# Patient Record
Sex: FEMALE | Race: WHITE | NOT HISPANIC OR LATINO
[De-identification: names, ages, dates, MRNs, and addresses within clinical notes are randomized per-mention and may not be internally consistent; named-entity substitution may affect disease eponyms.]

---

## 2017-03-21 ENCOUNTER — NON-APPOINTMENT (OUTPATIENT)
Age: 42
End: 2017-03-21

## 2017-05-12 ENCOUNTER — LABORATORY RESULT (OUTPATIENT)
Age: 42
End: 2017-05-12

## 2017-05-15 LAB
25(OH)D3 SERPL-MCNC: 26.8 NG/ML
CHOLEST SERPL-MCNC: 172 MG/DL
CHOLEST/HDLC SERPL: 4 RATIO
HDLC SERPL-MCNC: 42 MG/DL
LDLC SERPL CALC-MCNC: 107 MG/DL
T3 SERPL-MCNC: 105 NG/DL
T3FREE SERPL-MCNC: 2.81 PG/ML
T3RU NFR SERPL: 0.98 INDEX
T4 FREE SERPL-MCNC: 1.5 NG/DL
T4 SERPL-MCNC: 8.8 UG/DL
TRIGL SERPL-MCNC: 115 MG/DL
TSH SERPL-ACNC: 1.97 UIU/ML

## 2017-09-28 ENCOUNTER — OTHER (OUTPATIENT)
Age: 42
End: 2017-09-28

## 2017-09-28 ENCOUNTER — LABORATORY RESULT (OUTPATIENT)
Age: 42
End: 2017-09-28

## 2017-09-29 LAB
ALBUMIN SERPL ELPH-MCNC: 4 G/DL
ALP BLD-CCNC: 60 U/L
ALT SERPL-CCNC: 15 U/L
ANION GAP SERPL CALC-SCNC: 15 MMOL/L
AST SERPL-CCNC: 17 U/L
BASOPHILS # BLD AUTO: 0.04 K/UL
BASOPHILS NFR BLD AUTO: 0.7 %
BILIRUB SERPL-MCNC: 0.2 MG/DL
BUN SERPL-MCNC: 10 MG/DL
CALCIUM SERPL-MCNC: 8.9 MG/DL
CHLORIDE SERPL-SCNC: 105 MMOL/L
CHOLEST SERPL-MCNC: 176 MG/DL
CHOLEST/HDLC SERPL: 4 RATIO
CO2 SERPL-SCNC: 22 MMOL/L
CREAT SERPL-MCNC: 0.76 MG/DL
EOSINOPHIL # BLD AUTO: 0.15 K/UL
EOSINOPHIL NFR BLD AUTO: 2.7 %
GLUCOSE SERPL-MCNC: 85 MG/DL
HBA1C MFR BLD HPLC: 5.5 %
HCT VFR BLD CALC: 40.1 %
HDLC SERPL-MCNC: 44 MG/DL
HGB BLD-MCNC: 13.3 G/DL
IMM GRANULOCYTES NFR BLD AUTO: 1.6 %
LDLC SERPL CALC-MCNC: 113 MG/DL
LYMPHOCYTES # BLD AUTO: 2 K/UL
LYMPHOCYTES NFR BLD AUTO: 35.8 %
MAN DIFF?: NORMAL
MCHC RBC-ENTMCNC: 28.8 PG
MCHC RBC-ENTMCNC: 33.2 GM/DL
MCV RBC AUTO: 86.8 FL
MONOCYTES # BLD AUTO: 0.33 K/UL
MONOCYTES NFR BLD AUTO: 5.9 %
NEUTROPHILS # BLD AUTO: 2.98 K/UL
NEUTROPHILS NFR BLD AUTO: 53.3 %
PLATELET # BLD AUTO: 245 K/UL
POTASSIUM SERPL-SCNC: 4.5 MMOL/L
PROT SERPL-MCNC: 6.8 G/DL
RBC # BLD: 4.62 M/UL
RBC # FLD: 13.8 %
SODIUM SERPL-SCNC: 142 MMOL/L
TRIGL SERPL-MCNC: 93 MG/DL
TSH SERPL-ACNC: 1.62 UIU/ML
WBC # FLD AUTO: 5.59 K/UL

## 2018-01-19 ENCOUNTER — OTHER (OUTPATIENT)
Age: 43
End: 2018-01-19

## 2018-01-19 ENCOUNTER — LABORATORY RESULT (OUTPATIENT)
Age: 43
End: 2018-01-19

## 2018-01-23 LAB
T3 SERPL-MCNC: 94 NG/DL
T3FREE SERPL-MCNC: 2.36 PG/ML
T3RU NFR SERPL: 1.14 INDEX
T4 FREE SERPL-MCNC: 1.1 NG/DL
T4 SERPL-MCNC: 7.1 UG/DL
TSH SERPL-ACNC: 2.49 UIU/ML

## 2018-05-30 ENCOUNTER — LABORATORY RESULT (OUTPATIENT)
Age: 43
End: 2018-05-30

## 2018-05-30 ENCOUNTER — OTHER (OUTPATIENT)
Age: 43
End: 2018-05-30

## 2018-05-31 LAB
T3 SERPL-MCNC: 108 NG/DL
T3FREE SERPL-MCNC: 2.77 PG/ML
T3RU NFR SERPL: 1.05 INDEX
T4 FREE SERPL-MCNC: 1.4 NG/DL
T4 SERPL-MCNC: 8.1 UG/DL
TSH SERPL-ACNC: 1.71 UIU/ML

## 2018-08-01 ENCOUNTER — RX RENEWAL (OUTPATIENT)
Age: 43
End: 2018-08-01

## 2018-10-03 LAB
25(OH)D3 SERPL-MCNC: 28.4 NG/ML
ALBUMIN SERPL ELPH-MCNC: 4.4 G/DL
ALP BLD-CCNC: 67 U/L
ALT SERPL-CCNC: 20 U/L
ANION GAP SERPL CALC-SCNC: 13 MMOL/L
AST SERPL-CCNC: 19 U/L
BASOPHILS # BLD AUTO: 0.04 K/UL
BASOPHILS NFR BLD AUTO: 0.6 %
BILIRUB SERPL-MCNC: 0.3 MG/DL
BUN SERPL-MCNC: 10 MG/DL
CALCIUM SERPL-MCNC: 8.7 MG/DL
CHLORIDE SERPL-SCNC: 102 MMOL/L
CHOLEST SERPL-MCNC: 182 MG/DL
CHOLEST/HDLC SERPL: 4.4 RATIO
CO2 SERPL-SCNC: 22 MMOL/L
CREAT SERPL-MCNC: 0.65 MG/DL
EOSINOPHIL # BLD AUTO: 0.22 K/UL
EOSINOPHIL NFR BLD AUTO: 3.5 %
GLUCOSE SERPL-MCNC: 91 MG/DL
HBA1C MFR BLD HPLC: 5.5 %
HCT VFR BLD CALC: 41.4 %
HDLC SERPL-MCNC: 41 MG/DL
HGB BLD-MCNC: 13.7 G/DL
IMM GRANULOCYTES NFR BLD AUTO: 0.3 %
LDLC SERPL CALC-MCNC: 117 MG/DL
LYMPHOCYTES # BLD AUTO: 2.02 K/UL
LYMPHOCYTES NFR BLD AUTO: 32.4 %
MAN DIFF?: NORMAL
MCHC RBC-ENTMCNC: 29 PG
MCHC RBC-ENTMCNC: 33.1 GM/DL
MCV RBC AUTO: 87.5 FL
MONOCYTES # BLD AUTO: 0.31 K/UL
MONOCYTES NFR BLD AUTO: 5 %
NEUTROPHILS # BLD AUTO: 3.62 K/UL
NEUTROPHILS NFR BLD AUTO: 58.2 %
PLATELET # BLD AUTO: 248 K/UL
POTASSIUM SERPL-SCNC: 4.3 MMOL/L
PROT SERPL-MCNC: 7.2 G/DL
RBC # BLD: 4.73 M/UL
RBC # FLD: 14.9 %
SODIUM SERPL-SCNC: 137 MMOL/L
TRIGL SERPL-MCNC: 119 MG/DL
TSH SERPL-ACNC: 1.58 UIU/ML
WBC # FLD AUTO: 6.23 K/UL

## 2019-01-02 ENCOUNTER — LABORATORY RESULT (OUTPATIENT)
Age: 44
End: 2019-01-02

## 2019-01-02 ENCOUNTER — OTHER (OUTPATIENT)
Age: 44
End: 2019-01-02

## 2019-01-03 LAB
T3 SERPL-MCNC: 107 NG/DL
T3FREE SERPL-MCNC: 2.95 PG/ML
T3RU NFR SERPL: 1.06 INDEX
T4 FREE SERPL-MCNC: 1.5 NG/DL
T4 SERPL-MCNC: 8.8 UG/DL
TSH SERPL-ACNC: 1.7 UIU/ML

## 2019-01-24 ENCOUNTER — NON-APPOINTMENT (OUTPATIENT)
Age: 44
End: 2019-01-24

## 2019-01-29 ENCOUNTER — APPOINTMENT (OUTPATIENT)
Dept: HEART AND VASCULAR | Facility: CLINIC | Age: 44
End: 2019-01-29
Payer: COMMERCIAL

## 2019-01-29 VITALS
DIASTOLIC BLOOD PRESSURE: 74 MMHG | HEIGHT: 63 IN | WEIGHT: 216 LBS | BODY MASS INDEX: 38.27 KG/M2 | HEART RATE: 82 BPM | SYSTOLIC BLOOD PRESSURE: 108 MMHG

## 2019-01-29 PROCEDURE — 93320 DOPPLER ECHO COMPLETE: CPT

## 2019-01-29 PROCEDURE — 93351 STRESS TTE COMPLETE: CPT

## 2019-01-29 PROCEDURE — 93325 DOPPLER ECHO COLOR FLOW MAPG: CPT

## 2019-06-26 ENCOUNTER — NON-APPOINTMENT (OUTPATIENT)
Age: 44
End: 2019-06-26

## 2019-06-26 ENCOUNTER — TRANSCRIPTION ENCOUNTER (OUTPATIENT)
Age: 44
End: 2019-06-26

## 2019-06-26 ENCOUNTER — LABORATORY RESULT (OUTPATIENT)
Age: 44
End: 2019-06-26

## 2019-06-26 ENCOUNTER — APPOINTMENT (OUTPATIENT)
Dept: HEART AND VASCULAR | Facility: CLINIC | Age: 44
End: 2019-06-26
Payer: COMMERCIAL

## 2019-06-26 DIAGNOSIS — R07.9 CHEST PAIN, UNSPECIFIED: ICD-10-CM

## 2019-06-26 DIAGNOSIS — R07.89 OTHER CHEST PAIN: ICD-10-CM

## 2019-06-26 PROCEDURE — 93306 TTE W/DOPPLER COMPLETE: CPT

## 2019-06-27 ENCOUNTER — RESULT REVIEW (OUTPATIENT)
Age: 44
End: 2019-06-27

## 2019-06-27 LAB
ALBUMIN SERPL ELPH-MCNC: 4.3 G/DL
ALP BLD-CCNC: 59 U/L
ALT SERPL-CCNC: 13 U/L
ANION GAP SERPL CALC-SCNC: 12 MMOL/L
AST SERPL-CCNC: 16 U/L
BASOPHILS # BLD AUTO: 0.05 K/UL
BASOPHILS NFR BLD AUTO: 0.7 %
BILIRUB SERPL-MCNC: 0.3 MG/DL
BUN SERPL-MCNC: 8 MG/DL
CALCIUM SERPL-MCNC: 8.9 MG/DL
CHLORIDE SERPL-SCNC: 105 MMOL/L
CHOLEST SERPL-MCNC: 180 MG/DL
CHOLEST/HDLC SERPL: 5.1 RATIO
CK SERPL-CCNC: 58 U/L
CO2 SERPL-SCNC: 23 MMOL/L
CREAT SERPL-MCNC: 0.8 MG/DL
EOSINOPHIL # BLD AUTO: 0.16 K/UL
EOSINOPHIL NFR BLD AUTO: 2.1 %
GLUCOSE SERPL-MCNC: 86 MG/DL
HCT VFR BLD CALC: 43.1 %
HDLC SERPL-MCNC: 35 MG/DL
HGB BLD-MCNC: 13.2 G/DL
IMM GRANULOCYTES NFR BLD AUTO: 0.4 %
LDLC SERPL CALC-MCNC: 122 MG/DL
LYMPHOCYTES # BLD AUTO: 2.6 K/UL
LYMPHOCYTES NFR BLD AUTO: 34.3 %
MAN DIFF?: NORMAL
MCHC RBC-ENTMCNC: 28.4 PG
MCHC RBC-ENTMCNC: 30.6 GM/DL
MCV RBC AUTO: 92.9 FL
MONOCYTES # BLD AUTO: 0.37 K/UL
MONOCYTES NFR BLD AUTO: 4.9 %
NEUTROPHILS # BLD AUTO: 4.36 K/UL
NEUTROPHILS NFR BLD AUTO: 57.6 %
PLATELET # BLD AUTO: 260 K/UL
POTASSIUM SERPL-SCNC: 4.2 MMOL/L
PROT SERPL-MCNC: 7.2 G/DL
RBC # BLD: 4.64 M/UL
RBC # FLD: 14 %
SODIUM SERPL-SCNC: 140 MMOL/L
TRIGL SERPL-MCNC: 117 MG/DL
TROPONIN I SERPL-MCNC: 0.02 NG/ML
TSH SERPL-ACNC: 1.36 UIU/ML
WBC # FLD AUTO: 7.57 K/UL

## 2019-09-04 ENCOUNTER — LABORATORY RESULT (OUTPATIENT)
Age: 44
End: 2019-09-04

## 2019-09-05 ENCOUNTER — APPOINTMENT (OUTPATIENT)
Dept: HEART AND VASCULAR | Facility: CLINIC | Age: 44
End: 2019-09-05
Payer: COMMERCIAL

## 2019-09-05 PROCEDURE — 36415 COLL VENOUS BLD VENIPUNCTURE: CPT

## 2019-09-06 LAB
T3FREE SERPL-MCNC: 2.9 PG/ML
T3RU NFR SERPL: 1 TBI
T4 SERPL-MCNC: 7.9 UG/DL
TSH SERPL-ACNC: 2.66 UIU/ML

## 2019-12-13 ENCOUNTER — LABORATORY RESULT (OUTPATIENT)
Age: 44
End: 2019-12-13

## 2019-12-15 LAB
T3FREE SERPL-MCNC: 2.86 PG/ML
T3RU NFR SERPL: 1 TBI
T4 SERPL-MCNC: 8.5 UG/DL
TSH SERPL-ACNC: 0.79 UIU/ML

## 2020-03-13 ENCOUNTER — LABORATORY RESULT (OUTPATIENT)
Age: 45
End: 2020-03-13

## 2020-03-14 LAB
CHOLEST SERPL-MCNC: 189 MG/DL
CHOLEST/HDLC SERPL: 4 RATIO
HDLC SERPL-MCNC: 47 MG/DL
LDLC SERPL CALC-MCNC: 118 MG/DL
T3FREE SERPL-MCNC: 2.91 PG/ML
T3RU NFR SERPL: 1 TBI
T4 SERPL-MCNC: 8.8 UG/DL
TRIGL SERPL-MCNC: 116 MG/DL
TSH SERPL-ACNC: 1.3 UIU/ML

## 2020-04-20 ENCOUNTER — APPOINTMENT (OUTPATIENT)
Dept: HEART AND VASCULAR | Facility: CLINIC | Age: 45
End: 2020-04-20

## 2020-04-25 ENCOUNTER — MESSAGE (OUTPATIENT)
Age: 45
End: 2020-04-25

## 2020-05-01 ENCOUNTER — APPOINTMENT (OUTPATIENT)
Age: 45
End: 2020-05-01

## 2020-05-04 LAB
SARS-COV-2 IGG SERPL IA-ACNC: 4.6 RATIO
SARS-COV-2 IGG SERPL QL IA: POSITIVE

## 2020-09-17 LAB
ALBUMIN SERPL ELPH-MCNC: 4.3 G/DL
ALP BLD-CCNC: 60 U/L
ALT SERPL-CCNC: 11 U/L
ANION GAP SERPL CALC-SCNC: 12 MMOL/L
AST SERPL-CCNC: 12 U/L
BASOPHILS # BLD AUTO: 0.04 K/UL
BASOPHILS NFR BLD AUTO: 0.8 %
BILIRUB SERPL-MCNC: 0.3 MG/DL
BUN SERPL-MCNC: 9 MG/DL
CALCIUM SERPL-MCNC: 8.7 MG/DL
CHLORIDE SERPL-SCNC: 106 MMOL/L
CHOLEST SERPL-MCNC: 202 MG/DL
CHOLEST/HDLC SERPL: 6.2 RATIO
CO2 SERPL-SCNC: 24 MMOL/L
CREAT SERPL-MCNC: 0.75 MG/DL
EOSINOPHIL # BLD AUTO: 0.21 K/UL
EOSINOPHIL NFR BLD AUTO: 4 %
GLUCOSE SERPL-MCNC: 93 MG/DL
HCT VFR BLD CALC: 41.3 %
HDLC SERPL-MCNC: 32 MG/DL
HGB BLD-MCNC: 12.6 G/DL
IMM GRANULOCYTES NFR BLD AUTO: 0.4 %
LDLC SERPL CALC-MCNC: 145 MG/DL
LYMPHOCYTES # BLD AUTO: 1.99 K/UL
LYMPHOCYTES NFR BLD AUTO: 37.9 %
MAN DIFF?: NORMAL
MCHC RBC-ENTMCNC: 28 PG
MCHC RBC-ENTMCNC: 30.5 GM/DL
MCV RBC AUTO: 91.8 FL
MONOCYTES # BLD AUTO: 0.33 K/UL
MONOCYTES NFR BLD AUTO: 6.3 %
NEUTROPHILS # BLD AUTO: 2.66 K/UL
NEUTROPHILS NFR BLD AUTO: 50.6 %
PLATELET # BLD AUTO: 236 K/UL
POTASSIUM SERPL-SCNC: 4.3 MMOL/L
PROT SERPL-MCNC: 6.6 G/DL
RBC # BLD: 4.5 M/UL
RBC # FLD: 14 %
SODIUM SERPL-SCNC: 142 MMOL/L
T3 SERPL-MCNC: 86 NG/DL
T3FREE SERPL-MCNC: 2.23 PG/ML
T4 FREE SERPL-MCNC: 1.2 NG/DL
T4 SERPL-MCNC: 7.7 UG/DL
TRIGL SERPL-MCNC: 123 MG/DL
TSH SERPL-ACNC: 2.27 UIU/ML
WBC # FLD AUTO: 5.25 K/UL

## 2021-03-11 ENCOUNTER — NON-APPOINTMENT (OUTPATIENT)
Age: 46
End: 2021-03-11

## 2021-03-12 ENCOUNTER — NON-APPOINTMENT (OUTPATIENT)
Age: 46
End: 2021-03-12

## 2021-03-12 ENCOUNTER — APPOINTMENT (OUTPATIENT)
Dept: HEART AND VASCULAR | Facility: CLINIC | Age: 46
End: 2021-03-12

## 2021-03-19 LAB
ALBUMIN SERPL ELPH-MCNC: 4.3 G/DL
ALP BLD-CCNC: 60 U/L
ALT SERPL-CCNC: 13 U/L
ANION GAP SERPL CALC-SCNC: 13 MMOL/L
AST SERPL-CCNC: 15 U/L
BASOPHILS # BLD AUTO: 0.04 K/UL
BASOPHILS NFR BLD AUTO: 0.6 %
BILIRUB SERPL-MCNC: 0.2 MG/DL
BUN SERPL-MCNC: 7 MG/DL
CALCIUM SERPL-MCNC: 8.9 MG/DL
CHLORIDE SERPL-SCNC: 104 MMOL/L
CHOLEST SERPL-MCNC: 194 MG/DL
CO2 SERPL-SCNC: 21 MMOL/L
COVID-19 NUCLEOCAPSID  GAM ANTIBODY INTERPRETATION: POSITIVE
COVID-19 SPIKE DOMAIN ANTIBODY INTERPRETATION: POSITIVE
CREAT SERPL-MCNC: 0.7 MG/DL
EOSINOPHIL # BLD AUTO: 0.19 K/UL
EOSINOPHIL NFR BLD AUTO: 2.8 %
GLUCOSE SERPL-MCNC: 98 MG/DL
HCT VFR BLD CALC: 42.5 %
HDLC SERPL-MCNC: 50 MG/DL
HGB BLD-MCNC: 13.1 G/DL
IMM GRANULOCYTES NFR BLD AUTO: 0.4 %
LDLC SERPL CALC-MCNC: 127 MG/DL
LYMPHOCYTES # BLD AUTO: 2.2 K/UL
LYMPHOCYTES NFR BLD AUTO: 32.5 %
MAN DIFF?: NORMAL
MCHC RBC-ENTMCNC: 27.6 PG
MCHC RBC-ENTMCNC: 30.8 GM/DL
MCV RBC AUTO: 89.7 FL
MONOCYTES # BLD AUTO: 0.41 K/UL
MONOCYTES NFR BLD AUTO: 6.1 %
NEUTROPHILS # BLD AUTO: 3.89 K/UL
NEUTROPHILS NFR BLD AUTO: 57.6 %
NONHDLC SERPL-MCNC: 144 MG/DL
PLATELET # BLD AUTO: 256 K/UL
POTASSIUM SERPL-SCNC: 3.9 MMOL/L
PROT SERPL-MCNC: 7.4 G/DL
RBC # BLD: 4.74 M/UL
RBC # FLD: 14 %
SARS-COV-2 AB SERPL IA-ACNC: >250 U/ML
SARS-COV-2 AB SERPL QL IA: 27.3 INDEX
SODIUM SERPL-SCNC: 137 MMOL/L
T3 SERPL-MCNC: 96 NG/DL
T3FREE SERPL-MCNC: 2.4 PG/ML
T4 FREE SERPL-MCNC: 1.4 NG/DL
T4 SERPL-MCNC: 8.3 UG/DL
TRIGL SERPL-MCNC: 83 MG/DL
TSH SERPL-ACNC: 1.15 UIU/ML
VIT B12 SERPL-MCNC: 249 PG/ML
WBC # FLD AUTO: 6.76 K/UL

## 2021-03-21 ENCOUNTER — NON-APPOINTMENT (OUTPATIENT)
Age: 46
End: 2021-03-21

## 2021-03-22 LAB — 24R-OH-CALCIDIOL SERPL-MCNC: 66.9 PG/ML

## 2021-06-17 DIAGNOSIS — M17.0 BILATERAL PRIMARY OSTEOARTHRITIS OF KNEE: ICD-10-CM

## 2021-06-17 DIAGNOSIS — Z00.00 ENCOUNTER FOR GENERAL ADULT MEDICAL EXAMINATION W/OUT ABNORMAL FINDINGS: ICD-10-CM

## 2021-06-18 ENCOUNTER — NON-APPOINTMENT (OUTPATIENT)
Age: 46
End: 2021-06-18

## 2021-06-18 LAB
BASOPHILS # BLD AUTO: 0.06 K/UL
BASOPHILS NFR BLD AUTO: 0.9 %
CRP SERPL HS-MCNC: 3.57 MG/L
EOSINOPHIL # BLD AUTO: 0.15 K/UL
EOSINOPHIL NFR BLD AUTO: 2.2 %
ERYTHROCYTE [SEDIMENTATION RATE] IN BLOOD BY WESTERGREN METHOD: 13 MM/HR
HCT VFR BLD CALC: 41.7 %
HGB BLD-MCNC: 12.9 G/DL
IMM GRANULOCYTES NFR BLD AUTO: 0.1 %
LYMPHOCYTES # BLD AUTO: 2.05 K/UL
LYMPHOCYTES NFR BLD AUTO: 29.5 %
MAN DIFF?: NORMAL
MCHC RBC-ENTMCNC: 27.7 PG
MCHC RBC-ENTMCNC: 30.9 GM/DL
MCV RBC AUTO: 89.5 FL
MONOCYTES # BLD AUTO: 0.42 K/UL
MONOCYTES NFR BLD AUTO: 6.1 %
NEUTROPHILS # BLD AUTO: 4.25 K/UL
NEUTROPHILS NFR BLD AUTO: 61.2 %
PLATELET # BLD AUTO: 267 K/UL
RBC # BLD: 4.66 M/UL
RBC # FLD: 13.9 %
RHEUMATOID FACT SER QL: <10 IU/ML
WBC # FLD AUTO: 6.94 K/UL

## 2021-06-20 ENCOUNTER — NON-APPOINTMENT (OUTPATIENT)
Age: 46
End: 2021-06-20

## 2021-06-21 LAB
ANA PAT FLD IF-IMP: ABNORMAL
ANA SER IF-ACNC: ABNORMAL

## 2021-06-22 LAB

## 2021-09-14 ENCOUNTER — TRANSCRIPTION ENCOUNTER (OUTPATIENT)
Age: 46
End: 2021-09-14

## 2021-09-25 LAB
ALBUMIN SERPL ELPH-MCNC: 4.5 G/DL
ALP BLD-CCNC: 69 U/L
ALT SERPL-CCNC: 13 U/L
ANION GAP SERPL CALC-SCNC: 16 MMOL/L
AST SERPL-CCNC: 13 U/L
BASOPHILS # BLD AUTO: 0.06 K/UL
BASOPHILS NFR BLD AUTO: 0.9 %
BILIRUB SERPL-MCNC: 0.4 MG/DL
BUN SERPL-MCNC: 9 MG/DL
CALCIUM SERPL-MCNC: 9.3 MG/DL
CHLORIDE SERPL-SCNC: 105 MMOL/L
CHOLEST SERPL-MCNC: 183 MG/DL
CO2 SERPL-SCNC: 21 MMOL/L
CREAT SERPL-MCNC: 0.86 MG/DL
EOSINOPHIL # BLD AUTO: 0.14 K/UL
EOSINOPHIL NFR BLD AUTO: 2 %
GLUCOSE SERPL-MCNC: 107 MG/DL
HCT VFR BLD CALC: 41.7 %
HDLC SERPL-MCNC: 39 MG/DL
HGB BLD-MCNC: 13 G/DL
IMM GRANULOCYTES NFR BLD AUTO: 0.4 %
LDLC SERPL CALC-MCNC: 120 MG/DL
LYMPHOCYTES # BLD AUTO: 1.89 K/UL
LYMPHOCYTES NFR BLD AUTO: 26.9 %
MAN DIFF?: NORMAL
MCHC RBC-ENTMCNC: 27.7 PG
MCHC RBC-ENTMCNC: 31.2 GM/DL
MCV RBC AUTO: 88.9 FL
MONOCYTES # BLD AUTO: 0.43 K/UL
MONOCYTES NFR BLD AUTO: 6.1 %
NEUTROPHILS # BLD AUTO: 4.48 K/UL
NEUTROPHILS NFR BLD AUTO: 63.7 %
NONHDLC SERPL-MCNC: 144 MG/DL
PLATELET # BLD AUTO: 261 K/UL
POTASSIUM SERPL-SCNC: 4.9 MMOL/L
PROT SERPL-MCNC: 6.9 G/DL
RBC # BLD: 4.69 M/UL
RBC # FLD: 14.2 %
SODIUM SERPL-SCNC: 142 MMOL/L
T3 SERPL-MCNC: 100 NG/DL
T3FREE SERPL-MCNC: 2.82 PG/ML
T4 FREE SERPL-MCNC: 1.4 NG/DL
T4 SERPL-MCNC: 8.3 UG/DL
TRIGL SERPL-MCNC: 120 MG/DL
TSH SERPL-ACNC: 1.02 UIU/ML
WBC # FLD AUTO: 7.03 K/UL

## 2021-09-27 ENCOUNTER — NON-APPOINTMENT (OUTPATIENT)
Age: 46
End: 2021-09-27

## 2022-04-22 RX ORDER — CEPHALEXIN 500 MG/1
500 TABLET ORAL 3 TIMES DAILY
Qty: 21 | Refills: 0 | Status: DISCONTINUED | COMMUNITY
Start: 2021-05-17 | End: 2022-04-22

## 2022-04-22 RX ORDER — METHYLPREDNISOLONE 4 MG/1
4 TABLET ORAL
Qty: 1 | Refills: 0 | Status: DISCONTINUED | COMMUNITY
Start: 2018-08-01 | End: 2022-04-22

## 2022-05-04 ENCOUNTER — NON-APPOINTMENT (OUTPATIENT)
Age: 47
End: 2022-05-04

## 2022-07-01 ENCOUNTER — NON-APPOINTMENT (OUTPATIENT)
Age: 47
End: 2022-07-01

## 2022-07-01 LAB
CHOLEST SERPL-MCNC: 177 MG/DL
ESTIMATED AVERAGE GLUCOSE: 114 MG/DL
HBA1C MFR BLD HPLC: 5.6 %
HDLC SERPL-MCNC: 41 MG/DL
LDLC SERPL CALC-MCNC: 110 MG/DL
NONHDLC SERPL-MCNC: 136 MG/DL
T3 SERPL-MCNC: 114 NG/DL
T3FREE SERPL-MCNC: 2.88 PG/ML
T4 SERPL-MCNC: 9.2 UG/DL
TRIGL SERPL-MCNC: 131 MG/DL
TSH SERPL-ACNC: 1.06 UIU/ML

## 2022-07-22 ENCOUNTER — APPOINTMENT (OUTPATIENT)
Dept: HEART AND VASCULAR | Facility: CLINIC | Age: 47
End: 2022-07-22

## 2022-09-22 ENCOUNTER — NON-APPOINTMENT (OUTPATIENT)
Age: 47
End: 2022-09-22

## 2022-09-22 ENCOUNTER — APPOINTMENT (OUTPATIENT)
Dept: ENDOCRINOLOGY | Facility: CLINIC | Age: 47
End: 2022-09-22

## 2022-09-22 VITALS
SYSTOLIC BLOOD PRESSURE: 140 MMHG | WEIGHT: 224 LBS | HEIGHT: 63 IN | BODY MASS INDEX: 39.69 KG/M2 | OXYGEN SATURATION: 99 % | DIASTOLIC BLOOD PRESSURE: 82 MMHG | HEART RATE: 91 BPM

## 2022-09-22 DIAGNOSIS — E04.0 NONTOXIC DIFFUSE GOITER: ICD-10-CM

## 2022-09-22 PROCEDURE — 99204 OFFICE O/P NEW MOD 45 MIN: CPT

## 2022-09-22 RX ORDER — PSYLLIUM HUSK 0.4 G
CAPSULE ORAL
Refills: 0 | Status: ACTIVE | COMMUNITY

## 2022-09-22 RX ORDER — UBIDECARENONE/VIT E ACET 100MG-5
1000 CAPSULE ORAL
Refills: 0 | Status: ACTIVE | COMMUNITY

## 2022-09-22 RX ORDER — AZITHROMYCIN 250 MG/1
250 TABLET, FILM COATED ORAL
Qty: 1 | Refills: 1 | Status: DISCONTINUED | COMMUNITY
Start: 2020-04-07 | End: 2022-09-22

## 2022-09-22 NOTE — HISTORY OF PRESENT ILLNESS
[FreeTextEntry1] : 47-year-old lady presents establish care for hypothyroidism managed diagnosed with Hashimoto's disease or hypothyroidism at 28 years of age denies any local neck symptoms except for difficulty swallowing solids.  She denies any head radiation exposure to head and neck no exposure to nuclear accidents no family history of thyroid cancer no thyroid surgery has been on Synthroid 88 mcg for a long time.  Not taking biotin lithium or amiodarone no recent thyroid ultrasound had some thyroid blood work as noted in the chart doses and fairly stable.  Has been noticing shortness of breath more recently after her second COVID infection has had bad headaches and loss of smell and will be seeing ENT soon.

## 2022-09-23 LAB
ESTIMATED AVERAGE GLUCOSE: 111 MG/DL
HBA1C MFR BLD HPLC: 5.5 %
T3 SERPL-MCNC: 103 NG/DL
T4 FREE SERPL-MCNC: 1.2 NG/DL
TSH SERPL-ACNC: 2.58 UIU/ML

## 2022-09-26 LAB
THYROGLOB AB SERPL-ACNC: <20 IU/ML
THYROPEROXIDASE AB SERPL IA-ACNC: 694 IU/ML

## 2022-10-12 ENCOUNTER — APPOINTMENT (OUTPATIENT)
Dept: NEUROLOGY | Facility: CLINIC | Age: 47
End: 2022-10-12

## 2022-10-12 VITALS
HEART RATE: 88 BPM | DIASTOLIC BLOOD PRESSURE: 96 MMHG | WEIGHT: 225 LBS | BODY MASS INDEX: 39.87 KG/M2 | HEIGHT: 63 IN | OXYGEN SATURATION: 98 % | TEMPERATURE: 98.7 F | SYSTOLIC BLOOD PRESSURE: 131 MMHG

## 2022-10-12 DIAGNOSIS — Z86.16 PERSONAL HISTORY OF COVID-19: ICD-10-CM

## 2022-10-12 DIAGNOSIS — M79.604 PAIN IN RIGHT LEG: ICD-10-CM

## 2022-10-12 DIAGNOSIS — M79.605 PAIN IN RIGHT LEG: ICD-10-CM

## 2022-10-12 DIAGNOSIS — R51.9 HEADACHE, UNSPECIFIED: ICD-10-CM

## 2022-10-12 PROCEDURE — 99205 OFFICE O/P NEW HI 60 MIN: CPT

## 2022-10-12 NOTE — ASSESSMENT
[FreeTextEntry1] : 47 yr old female presents with new onset headaches and self reported forgetfulness, weakness n left hand and difficulty walking. I did not notice any gross abnormality on exam . These symptoms started after her second  bout of COVID 19 infection but it is unclear if it is related . She did mention her sleep has become fragmented due to perisistent foot pain. \par \par 1) MRI brain C+/C-\par 2) Screen for b12 deficiency and lyme \par 3) Podiatry referral \par \par F/u with NP in 6 weeks.

## 2022-10-12 NOTE — HISTORY OF PRESENT ILLNESS
[FreeTextEntry1] : \par April 2020 had first bout of COVID 19 infection . She had sinus headache, anosmia ,  URI and intermittent shortness of breath. She was never hospitalized. She had sinus like pressure at that time but that resolved with tylenol. After 10 days she was asymptomatic. Her sense of smell also returned. \par \par She acquired Covid 19 infection July 2022. She had fatigue, fever, sinus pressure headache, anosmia . The symptoms resolved quicker in a matter of 3-4 days this time. \par \par In August she started noting recurring sinus pressure headaches. She went to ENT who told her there was no sinus infection. She started to feel lightheadedness upon turning her head. She has now realized that her sense of smell is not back to normal . Headaches are occurring 2-3 times per week i the middle of her forehead. The pain is pressure like pain, 5/10 on VAS scale, she will take one Tylenol and it resolves. She has been trying to hydrate better . She does not miss meals . She admits her sleep quality has deteriorated because of pain in her legs due to arthritis in her knees and feet. She is waking up 2 times in the night. She feels tired through out the day. \par \par \par She has been noticing issues with forgetfulness even before the onset of headaches. She has noted she might struggle to come up with a word . She feels she has to focus more to walk carefully down stairs. She has to remind herself to move one foot after the other. She has noted she cannot grasp as well with her left hand. She denies any pain in her left hand

## 2022-10-12 NOTE — REVIEW OF SYSTEMS
[Numbness] : numbness [Tingling] : tingling [Tension Headache] : tension-type headaches [Fever] : no fever [Chills] : no chills [Feeling Poorly] : feeling poorly [Feeling Tired] : feeling tired [Recent Weight Gain (___ Lbs)] : no recent weight gain [Recent Weight Loss (___ Lbs)] : no recent weight loss [As Noted in HPI] : as noted in HPI [Confused or Disoriented] : confusion [Memory Lapses or Loss] : memory loss [Decr. Concentrating Ability] : decreased concentrating ability [Difficulty with Language] : no ~M difficulty with language [Changed Thought Patterns] : no change in thought patterns [Repeating Questions] : no repeated questioning about recent events [Facial Weakness] : no facial weakness [Arm Weakness] : no arm weakness [Hand Weakness] :  hand weakness [Leg Weakness] : no leg weakness [Poor Coordination] : good coordination [Difficulty Writing] : no difficulty writing [Difficulties in Speech] : no speech difficulties [Seizures] : no convulsions [Dizziness] : dizziness [Fainting] : no fainting [Lightheadedness] : lightheadedness [Vertigo] : no vertigo [Cluster Headache] : no cluster headache [Migraine Headache] : no migraine headache [Difficulty Walking] : difficulty walking [Inability to Walk] : able to walk [Ataxia] : no ataxia [Frequent Falls] : not falling [Limping] : not limping [Shortness Of Breath] : no shortness of breath [Wheezing] : no wheezing [Cough] : no cough [SOB on Exertion] : no shortness of breath during exertion [Orthopnea] : no orthopnea [PND] : no PND [Joint Pain] : joint pain [Joint Swelling] : no joint swelling [Joint Stiffness] : no joint stiffness [Limb Pain] : limb pain [Limb Swelling] : no limb swelling [Skin Lesions] : no skin lesions [Skin Wound] : no skin wound [Itching] : no itching [Change In A Mole] : no change in a mole [Breast Lump] : no breast lump [Proptosis] : no proptosis [Hot Flashes] : no hot flashes [Muscle Weakness] : no muscle weakness [Deepening Of The Voice] : no deepening of the voice [Feelings Of Weakness] : no feelings of weakness [FreeTextEntry9] : foot pain

## 2022-10-12 NOTE — PHYSICAL EXAM
[FreeTextEntry1] : Mental status:\par Orientation: Alert , oriented to month, day of week, year, location                                                                           Speech is fluent , able to name objects, repeat a sentence and write a sentence                                                 Memory: Short term tested by registering  3 objects and recalled 2/3 in 5 min; Long term memory intact based on correct recall of past events and demographic details.                                                                                     Concentration: able to do serial 7 calculation  5/5 and spell world backwards                                                       Comprehension : able follow 3 step requests                                                                                                          Apraxia and visuospatial able to draw clock drawing and intersecting pentagon                                                 Neglect is absent                                                                                                                                                      Agnosia is absent                                                                                                                                         \par MMSE 30/30\par Cranial nerves:\par CN I deferred. CN  II VFF; fundus exam benign; III, IV, VI: PERRLA, EOM IV: Facial sensation normal B/L to touch, pinprick and temperatureVII:Facial strength normal B/L. VIII: Gross hearing intact IX, X: palate midline and elevates symmetrically XI: Trapezius normal strength, XII: Tongue midline without atrophy or fasciculation\par Motor: Muscle tone no rigidity or resistance in all 4 extremities. No atrophy or fasciculation. Muscle strength: arms and legs, proximal and distal flexors and extensors are normal 5/5 . No UE drift.\par Sensory: intact to pinprick, temperature  sensation to vibration and cold. \par Reflexes: all present, normal, and symmetrical 1+ \par Coordination: finger to nose: normal. Heel to shin: normal\par Gait: steady normal based ; Romberg test negative \par \par

## 2022-10-12 NOTE — REASON FOR VISIT
[Consultation] : a consultation visit [FreeTextEntry1] : Headacvhes,numbness,dizziness forgetfulness

## 2022-10-17 LAB
HCYS SERPL-MCNC: 10.5 UMOL/L
VIT B12 SERPL-MCNC: 218 PG/ML

## 2022-10-19 ENCOUNTER — APPOINTMENT (OUTPATIENT)
Dept: PODIATRY | Facility: CLINIC | Age: 47
End: 2022-10-19
Payer: COMMERCIAL

## 2022-10-19 ENCOUNTER — NON-APPOINTMENT (OUTPATIENT)
Age: 47
End: 2022-10-19

## 2022-10-19 ENCOUNTER — APPOINTMENT (OUTPATIENT)
Dept: PODIATRY | Facility: CLINIC | Age: 47
End: 2022-10-19

## 2022-10-19 VITALS — HEIGHT: 63 IN | WEIGHT: 225 LBS | BODY MASS INDEX: 39.87 KG/M2

## 2022-10-19 DIAGNOSIS — Z82.49 FAMILY HISTORY OF ISCHEMIC HEART DISEASE AND OTHER DISEASES OF THE CIRCULATORY SYSTEM: ICD-10-CM

## 2022-10-19 DIAGNOSIS — Z86.79 PERSONAL HISTORY OF OTHER DISEASES OF THE CIRCULATORY SYSTEM: ICD-10-CM

## 2022-10-19 DIAGNOSIS — Z86.39 PERSONAL HISTORY OF OTHER ENDOCRINE, NUTRITIONAL AND METABOLIC DISEASE: ICD-10-CM

## 2022-10-19 DIAGNOSIS — Z78.9 OTHER SPECIFIED HEALTH STATUS: ICD-10-CM

## 2022-10-19 DIAGNOSIS — R20.0 ANESTHESIA OF SKIN: ICD-10-CM

## 2022-10-19 DIAGNOSIS — Z80.3 FAMILY HISTORY OF MALIGNANT NEOPLASM OF BREAST: ICD-10-CM

## 2022-10-19 DIAGNOSIS — R42 DIZZINESS AND GIDDINESS: ICD-10-CM

## 2022-10-19 LAB — IF BLOCK AB SER QL: 0.9 AU/ML

## 2022-10-19 PROCEDURE — 73630 X-RAY EXAM OF FOOT: CPT | Mod: RT

## 2022-10-19 PROCEDURE — L3000: CPT | Mod: LT

## 2022-10-19 PROCEDURE — 97760 ORTHOTIC MGMT&TRAING 1ST ENC: CPT

## 2022-10-19 PROCEDURE — 99204 OFFICE O/P NEW MOD 45 MIN: CPT

## 2022-10-19 NOTE — HISTORY OF PRESENT ILLNESS
[FreeTextEntry1] : Location: both feet\par Duration: > 2 years\par Etiology: unknown-no hx of injury or trauma\par Past Tx: saw ortho, shoe alteration to accommodate the sx\par Exacerbated by: certain shoes and time on her feet\par Prior Hx: no\par

## 2022-10-19 NOTE — PHYSICAL EXAM
[General Appearance - Alert] : alert [General Appearance - In No Acute Distress] : in no acute distress [Skin Color & Pigmentation] : normal skin color and pigmentation [Skin Turgor] : normal skin turgor [Skin Lesions] : no skin lesions [] : no rash [Sensation] : the sensory exam was normal to light touch and pinprick [No Focal Deficits] : no focal deficits [Motor Exam] : the motor exam was normal [Deep Tendon Reflexes (DTR)] : deep tendon reflexes were 2+ and symmetric [Oriented To Time, Place, And Person] : oriented to person, place, and time [Impaired Insight] : insight and judgment were intact [Affect] : the affect was normal [FreeTextEntry3] : Vascular exam reveals palpable pedal pulses, the foot is warm to touch, there was good capillary fill time, the skin is normal in appearance there is no evidence of vascular disease or compromise at this time [de-identified] : several palpable midfoot spurs felt dorsally as well as palpable hypertrophy or accessory naviculars both feet right>left [Foot Ulcer] : no foot ulcer [Skin Induration] : no skin induration

## 2022-10-19 NOTE — PROCEDURE
[FreeTextEntry1] : X-rays were taken in the office multiple views right foot\par X-rays were taken in the office multiple views of the left foot\par multiple midfoot spurs both feet, chip fx noted talvavicular joint area right\par had a lengthy discussion with the patient regarding the diagnosis etiology and differential diagnosis as well as treatment options for the presenting problem. Risks alternatives and benefits of treatment ranging from conservative to surgical explained in great detail. I also explained the progression of treatment from conservative to possible surgical treatment options as well as the benefits of each. I do stress conservative treatment if in fact conservative treatment is an option until it no longer provides relief. Over-the-counter products medications padding, and splinting were reviewed as well. All questions asked and answered appropriately to the patient's satisfaction\par Based on my physical examination and my clinical findings and the patient's description of the symptoms, a complete differential diagnosis was reviewed with the patient. Possible diagnoses as well as treatment options explained in great detail. All questions asked and answered appropriately.\par  I had a lengthy discussion with the patient all questions asked and answered appropriately regarding a diagnosis of tendinitis/tenosynovitis. I did explain to the patient etiology of this diagnosis as well as treatment options. It is an inflammation of the tendons on the top of the foot. In many cases both pressure as well as excessive motion can cause inflammation of the tendons and is treated with a combination of immobilization, anti-inflammatories, physical therapy, or combination of all of the above. Patient was given at home exercises to do and I did explain to them that there is a need for strict compliance to my chosen treatment options.All questions asked and answered appropriately to the patient's satisfaction. , A complete and thorough evaluation of the type of shoes they should be wearing and type of shoes for this time of year was discussed with patient.\par Since the patient is currently taking non-steroidal anti-inflammatory medication I had a complete and thorough discussion with the patient regarding risks and benefits of these types of medications. The most common side effects include but are not limited to gastrointestinal upset, blood in the stool, nausea, diarrhea, as well as tinnitus. I have advised the patient that if they should experience any of the side effects that she discontinue them at once and contacted primary care physician for immediate followup. I did advise the patient that these types of medications can be taken on an as needed basis and if they are not having pain he should not take them. I also advised that the patient should follow the explicit instructions on the labile and not to exceed the recommended dosage.\par During the evaluation and management I had a lengthy discussion with the patient regarding benefits of functional foot orthoses. I explained to the patient the etiology and treatment options and one of them included the offloading and balancing of the painful portion of the foot. I explained the importance of balancing in offloading the painful area as part of the overall treatment process to advance healing. I have asked the patient to consider this as part of the treatment\par After a lengthy discussion with the patient regarding the possible benefits of orthotics and what we hope to achieve with them as it relates to their diagnosis the patient has agreed to be casted for the devices, The patient was then casted for a pair of custom functional foot orthoses with the subtalar joint in neutral, the forefoot locked, The patient was advised that they will be notified when the orthotics are returned from the laboratory, Should there be any questions or concerns they were advised to contact the office immediately, Educational literature regarding orthotics were dispensed, Included in the casting for orthotics was an overall gait exam and biomechanical evaluation\par greater than 45 minutes spent with patient\par \par follow up appt 4 weeks\par

## 2022-10-20 LAB — METHYLMALONATE SERPL-SCNC: 243 NMOL/L

## 2022-10-28 ENCOUNTER — RESULT REVIEW (OUTPATIENT)
Age: 47
End: 2022-10-28

## 2022-11-07 ENCOUNTER — TRANSCRIPTION ENCOUNTER (OUTPATIENT)
Age: 47
End: 2022-11-07

## 2022-11-15 ENCOUNTER — APPOINTMENT (OUTPATIENT)
Dept: HEART AND VASCULAR | Facility: CLINIC | Age: 47
End: 2022-11-15

## 2022-11-15 PROCEDURE — 90471 IMMUNIZATION ADMIN: CPT

## 2022-11-15 PROCEDURE — 90656 IIV3 VACC NO PRSV 0.5 ML IM: CPT

## 2022-11-19 ENCOUNTER — APPOINTMENT (OUTPATIENT)
Dept: PODIATRY | Facility: CLINIC | Age: 47
End: 2022-11-19

## 2022-11-19 VITALS
WEIGHT: 225 LBS | DIASTOLIC BLOOD PRESSURE: 89 MMHG | OXYGEN SATURATION: 98 % | BODY MASS INDEX: 39.87 KG/M2 | HEART RATE: 77 BPM | HEIGHT: 63 IN | SYSTOLIC BLOOD PRESSURE: 133 MMHG

## 2022-11-19 DIAGNOSIS — S92.154A NONDISPLACED AVULSION FRACTURE (CHIP FRACTURE) OF RIGHT TALUS, INITIAL ENCOUNTER FOR CLOSED FRACTURE: ICD-10-CM

## 2022-11-19 DIAGNOSIS — M19.079 PRIMARY OSTEOARTHRITIS, UNSPECIFIED ANKLE AND FOOT: ICD-10-CM

## 2022-11-19 DIAGNOSIS — M77.52 OTHER ENTHESOPATHY OF LT FOOT AND ANKLE: ICD-10-CM

## 2022-11-19 DIAGNOSIS — M76.812 ANTERIOR TIBIAL SYNDROME, LEFT LEG: ICD-10-CM

## 2022-11-19 DIAGNOSIS — M77.51 OTHER ENTHESOPATHY OF RT FOOT AND ANKLE: ICD-10-CM

## 2022-11-19 PROCEDURE — 99213 OFFICE O/P EST LOW 20 MIN: CPT

## 2022-11-19 PROCEDURE — 97760 ORTHOTIC MGMT&TRAING 1ST ENC: CPT

## 2022-11-19 NOTE — HISTORY OF PRESENT ILLNESS
[FreeTextEntry1] : Location: both feet\par Duration: > 2 years\par Etiology: unknown-no hx of injury or trauma\par Past Tx: saw ortho, shoe alteration to accommodate the sx\par Exacerbated by: certain shoes and time on her feet\par Prior Hx: no\par \par The patient presents today for evaluation, fitting and dispensing of custom made foot orthotics\par \par New c/o dorsolateral left foot pain-recently over past 2 weeks\par

## 2022-11-19 NOTE — PHYSICAL EXAM
[Skin Color & Pigmentation] : normal skin color and pigmentation [] : no rash [Skin Turgor] : normal skin turgor [Skin Lesions] : no skin lesions [FreeTextEntry3] : Vascular exam reveals palpable pedal pulses, the foot is warm to touch, there was good capillary fill time, the skin is normal in appearance there is no evidence of vascular disease or compromise at this time [de-identified] : several palpable midfoot spurs felt dorsally as well as palpable hypertrophy or accessory naviculars both feet right>left\par \par New complaint is to the area of the anterior tibial tendon lateral left ankle area [Foot Ulcer] : no foot ulcer [Skin Induration] : no skin induration

## 2022-11-19 NOTE — REVIEW OF SYSTEMS
[Negative] : Integumentary [Joint Swelling] : no joint swelling [Joint Stiffness] : no joint stiffness

## 2022-11-19 NOTE — PROCEDURE
[facility]stomy care-Called pt and pouch system applied yesterday only lasted about 5 minutes after placed. Pt was able to get the clotrimazole- betamethasone cream and states that pain is much improved. With pt's permission, I did try to call [employer] for sample product but they are limiting their samples to only their customers. I attempted to call [employer] but unable to leave email for them. [employer] is company pt is using and suggested pt call them herself to ask for samples of the [employer] [facility]rostomy pouch # [id number].Will follow up with pt next week.     [FreeTextEntry1] : The patient presents for dispensing of custom molded foot orthotics. I have removed the orthotics from the packaging and I have examined him. They do appear to be made as per my instructions regarding materials additions corrections. Upon placing him to the patient's foot they do appear to fit nicely. There is no material failure nor gapping. They were then trimmed to fit and placed inside the patient's shoe gear. There appears to be a good fit. Upon initial ambulation the patient has no initial complaints regarding pain off edges were tight fit. The patient did ambulate around the office for several minutes and had a favorable result. I then explained to the patient the normal break-in period. The paperwork supplied by the laboratory was reviewed with the patient. They understand a normal break-in period is to be gradual over several weeks. I've advised the patient that different, weird, and uncomfortable are certainly acceptable words in the beginning however pain, blister and callus are things that should not occur. Once the proper break-in was explained to the patient they were given an appointment to follow up.\par \par NEW COMPLAINT:\par  I had a lengthy discussion with the patient all questions asked and answered appropriately regarding a diagnosis of tendinitis/tenosynovitis. I did explain to the patient etiology of this diagnosis as well as treatment options. It is an inflammation of the tendons on the top of the foot. In many cases both pressure as well as excessive motion can cause inflammation of the tendons and is treated with a combination of immobilization, anti-inflammatories, physical therapy, or combination of all of the above. Patient was given at home exercises to do and I did explain to them that there is a need for strict compliance to my chosen treatment options.All questions asked and answered appropriately to the patient's satisfaction. , A complete and thorough evaluation of the type of shoes they should be wearing and type of shoes for this time of year was discussed with patient.\par \par reduce nsaids to only once daily then stop and take only prn or only take OTC as needed\par Copy of my range of motion and strengthening exercises were distributed to the patient. I also explained that the patient must do these daily as well as ice afterwards. I also advised the type of shoe gear that would both support as well as conversely aggravate this condition.\par follow up appt 4 weeks\par

## 2022-12-19 ENCOUNTER — APPOINTMENT (OUTPATIENT)
Dept: NEUROLOGY | Facility: CLINIC | Age: 47
End: 2022-12-19

## 2022-12-19 VITALS
HEIGHT: 63 IN | DIASTOLIC BLOOD PRESSURE: 90 MMHG | WEIGHT: 220 LBS | SYSTOLIC BLOOD PRESSURE: 127 MMHG | HEART RATE: 81 BPM | BODY MASS INDEX: 38.98 KG/M2

## 2022-12-19 DIAGNOSIS — R68.89 OTHER GENERAL SYMPTOMS AND SIGNS: ICD-10-CM

## 2022-12-19 DIAGNOSIS — E53.8 DEFICIENCY OF OTHER SPECIFIED B GROUP VITAMINS: ICD-10-CM

## 2022-12-19 DIAGNOSIS — R51.9 HEADACHE, UNSPECIFIED: ICD-10-CM

## 2022-12-19 PROCEDURE — 99214 OFFICE O/P EST MOD 30 MIN: CPT

## 2022-12-19 RX ORDER — FEXOFENADINE HCL 60 MG
CAPSULE ORAL
Refills: 0 | Status: ACTIVE | COMMUNITY

## 2022-12-19 RX ORDER — CYANOCOBALAMIN 1000 UG/ML
1000 INJECTION INTRAMUSCULAR; SUBCUTANEOUS
Qty: 1 | Refills: 0 | Status: COMPLETED | COMMUNITY
Start: 2022-10-18 | End: 2022-12-19

## 2022-12-19 NOTE — HISTORY OF PRESENT ILLNESS
[FreeTextEntry1] : 47 yr old female here for follow-up.  Previously seen in October 2022 for new onset headaches and self reported forgetfulness, no gross abnormality on exam.  Work-up revealed Vitamin B12 deficiency, since has started supplements and headaches and forgetfulness have improved. Reports headaches once a week 3/10 on VAS pain score , managing with tylenol 1 extra strength 500mg which alleviates pain.  Contributing factor as well was due to disrupted sleep due to persistent foot and knee pain, seen by podiatry received foot inserts and starting physical therapy for knee pain that continues to improve, now getting 8+ hours sleep nightly.  Water intake 32-48oz daily.  Reviewed MRI Brain imaging, unremarkable no evidence of abnormality.

## 2022-12-19 NOTE — ASSESSMENT
[FreeTextEntry1] : 47 yr old female here for follow-up.  Previously seen in October 2022 for new onset headaches and self reported forgetfulness, no gross abnormality on exam.  Work-up revealed Vitamin B12 deficiency, since has started supplements and headaches and forgetfulness have improved. Contributing factor as well was disrupted sleep due to persistent foot and knee pain, seen by podiatry received foot inserts and starting physical therapy for knee pain that continues to improve, now getting 8+ hours sleep nightly.  \par \par Reviewed MRI Brain imaging, unremarkable no evidence of abnormality. \par \par Work-up from prior visit, lyme test ordered but lab [facility] was unable to run test, will re-order\par Follow-up in 6 months

## 2022-12-19 NOTE — DATA REVIEWED
[de-identified] : 10/28/22:\par Impression- no evidence of recent infarct, edema, hemorrhage, or demyelination.  No intracranial enhancing lesion.

## 2023-01-07 ENCOUNTER — APPOINTMENT (OUTPATIENT)
Dept: PODIATRY | Facility: CLINIC | Age: 48
End: 2023-01-07
Payer: COMMERCIAL

## 2023-01-07 VITALS
HEIGHT: 63 IN | HEART RATE: 90 BPM | BODY MASS INDEX: 38.98 KG/M2 | SYSTOLIC BLOOD PRESSURE: 131 MMHG | DIASTOLIC BLOOD PRESSURE: 85 MMHG | WEIGHT: 220 LBS

## 2023-01-07 PROCEDURE — 99212 OFFICE O/P EST SF 10 MIN: CPT | Mod: NC

## 2023-01-07 PROCEDURE — L8499: CPT | Mod: NC

## 2023-01-07 NOTE — HISTORY OF PRESENT ILLNESS
[FreeTextEntry1] : Location: both feet\par Duration: > 2 years\par Etiology: unknown-no hx of injury or trauma\par Past Tx: saw ortho, shoe alteration to accommodate the sx\par Exacerbated by: certain shoes and time on her feet\par Prior Hx: no\par \par The patient presents today for f/u of custom made foot orthotics, stating they don't fit her shoes well\par

## 2023-01-07 NOTE — ASSESSMENT
[FreeTextEntry1] : patient neglected to take her [id number] out of the sneakers.\par \par after removal of the innersoles, orthotics are comfoertable\par \par follow up prn\par

## 2023-01-07 NOTE — PHYSICAL EXAM
[de-identified] : several palpable midfoot spurs felt dorsally as well as palpable hypertrophy or accessory naviculars both feet right>left\par \par New complaint is to the area of the anterior tibial tendon lateral left ankle area

## 2023-03-22 ENCOUNTER — APPOINTMENT (OUTPATIENT)
Dept: ENDOCRINOLOGY | Facility: CLINIC | Age: 48
End: 2023-03-22
Payer: COMMERCIAL

## 2023-03-22 VITALS
HEART RATE: 84 BPM | WEIGHT: 220 LBS | HEIGHT: 63 IN | SYSTOLIC BLOOD PRESSURE: 122 MMHG | BODY MASS INDEX: 38.98 KG/M2 | DIASTOLIC BLOOD PRESSURE: 80 MMHG | OXYGEN SATURATION: 99 %

## 2023-03-22 DIAGNOSIS — E03.9 HYPOTHYROIDISM, UNSPECIFIED: ICD-10-CM

## 2023-03-22 PROCEDURE — 99214 OFFICE O/P EST MOD 30 MIN: CPT | Mod: 25

## 2023-03-22 PROCEDURE — G0444 DEPRESSION SCREEN ANNUAL: CPT

## 2023-03-22 RX ORDER — FLUTICASONE PROPIONATE 50 UG/1
50 SPRAY, METERED NASAL TWICE DAILY
Qty: 3 | Refills: 3 | Status: DISCONTINUED | COMMUNITY
Start: 2022-09-30 | End: 2023-03-22

## 2023-03-22 RX ORDER — DICLOFENAC SODIUM 50 MG/1
50 TABLET, DELAYED RELEASE ORAL
Qty: 60 | Refills: 0 | Status: DISCONTINUED | COMMUNITY
Start: 2022-10-19 | End: 2023-03-22

## 2023-03-22 NOTE — ASSESSMENT
[0] : 2) Feeling down, depressed, or hopeless: Not at all (0) [PHQ-2 Negative - No further assessment needed] : PHQ-2 Negative - No further assessment needed [[id number]] : 0

## 2023-03-22 NOTE — HISTORY OF PRESENT ILLNESS
[FreeTextEntry1] : 47-year-old lady presents establish care for hypothyroidism managed diagnosed with Hashimoto's disease or hypothyroidism at 28 years of age denies any local neck symptoms except for difficulty swallowing solids.  She denies any head radiation exposure to head and neck no exposure to nuclear accidents no family history of thyroid cancer no thyroid surgery has been on Synthroid 88 mcg for a long time.  Not taking biotin lithium or amiodarone no recent thyroid ultrasound had some thyroid blood work as noted in the chart doses and fairly stable.  Has been noticing shortness of breath more recently after her second COVID infection has had bad headaches and loss of smell and will be seeing ENT soon.\par \par 03/22/2023- FOLLOW UP\par \par doing well on 88 mcg of lt4 - synthyroid brand\par Wants to consider treatment with GLP-1 agonist.  We discussed the pros and cons contraindications no history of pancreatitis or thyroid cancer in the family.  Discussed diet and lifestyle measures.  I encouraged her to trial the same.  I will refer her to the medical weight management program.  Advised to leveraged resources through [facility] my experience.  All questions and concerns addressed follow-up in 6 months from now.

## 2023-03-23 LAB
T4 FREE SERPL-MCNC: 1.4 NG/DL
TSH SERPL-ACNC: 1.08 UIU/ML

## 2023-06-26 ENCOUNTER — RX RENEWAL (OUTPATIENT)
Age: 48
End: 2023-06-26

## 2023-09-06 ENCOUNTER — RX RENEWAL (OUTPATIENT)
Age: 48
End: 2023-09-06

## 2023-09-06 RX ORDER — LEVOTHYROXINE SODIUM 0.09 MG/1
88 TABLET ORAL
Qty: 90 | Refills: 0 | Status: ACTIVE | COMMUNITY
Start: 2023-06-26 | End: 1900-01-01

## 2023-09-20 ENCOUNTER — APPOINTMENT (OUTPATIENT)
Dept: ENDOCRINOLOGY | Facility: CLINIC | Age: 48
End: 2023-09-20

## 2023-10-31 RX ORDER — ONDANSETRON 4 MG/1
4 TABLET ORAL
Qty: 90 | Refills: 0 | Status: ACTIVE | COMMUNITY
Start: 2023-10-31 | End: 1900-01-01

## 2023-11-21 ENCOUNTER — LABORATORY RESULT (OUTPATIENT)
Age: 48
End: 2023-11-21

## 2023-11-22 LAB
ALBUMIN SERPL ELPH-MCNC: 4.4 G/DL
ALP BLD-CCNC: 58 U/L
ALT SERPL-CCNC: 14 U/L
ANION GAP SERPL CALC-SCNC: 17 MMOL/L
AST SERPL-CCNC: 16 U/L
BASOPHILS # BLD AUTO: 0.05 K/UL
BASOPHILS NFR BLD AUTO: 0.9 %
BILIRUB SERPL-MCNC: 0.2 MG/DL
BUN SERPL-MCNC: 8 MG/DL
CALCIUM SERPL-MCNC: 8.8 MG/DL
CHLORIDE SERPL-SCNC: 104 MMOL/L
CHOLEST SERPL-MCNC: 157 MG/DL
CO2 SERPL-SCNC: 20 MMOL/L
CREAT SERPL-MCNC: 0.81 MG/DL
EGFR: 88 ML/MIN/1.73M2
EOSINOPHIL # BLD AUTO: 0.12 K/UL
EOSINOPHIL NFR BLD AUTO: 2.1 %
ESTIMATED AVERAGE GLUCOSE: 105 MG/DL
GLUCOSE SERPL-MCNC: 82 MG/DL
HBA1C MFR BLD HPLC: 5.3 %
HCT VFR BLD CALC: 41 %
HDLC SERPL-MCNC: 35 MG/DL
HGB BLD-MCNC: 13.2 G/DL
IMM GRANULOCYTES NFR BLD AUTO: 0.4 %
LDLC SERPL CALC-MCNC: 105 MG/DL
LYMPHOCYTES # BLD AUTO: 2 K/UL
LYMPHOCYTES NFR BLD AUTO: 35.5 %
MAN DIFF?: NORMAL
MCHC RBC-ENTMCNC: 28.8 PG
MCHC RBC-ENTMCNC: 32.2 GM/DL
MCV RBC AUTO: 89.5 FL
MONOCYTES # BLD AUTO: 0.31 K/UL
MONOCYTES NFR BLD AUTO: 5.5 %
NEUTROPHILS # BLD AUTO: 3.13 K/UL
NEUTROPHILS NFR BLD AUTO: 55.6 %
NONHDLC SERPL-MCNC: 122 MG/DL
PLATELET # BLD AUTO: 250 K/UL
POTASSIUM SERPL-SCNC: 4.1 MMOL/L
PROT SERPL-MCNC: 7.2 G/DL
RBC # BLD: 4.58 M/UL
RBC # FLD: 13.9 %
SODIUM SERPL-SCNC: 140 MMOL/L
T3FREE SERPL-MCNC: 2.73 PG/ML
T3RU NFR SERPL: 1 TBI
T4 SERPL-MCNC: 8.9 UG/DL
TRIGL SERPL-MCNC: 94 MG/DL
TSH SERPL-ACNC: 0.7 UIU/ML
WBC # FLD AUTO: 5.63 K/UL

## 2024-01-22 ENCOUNTER — TRANSCRIPTION ENCOUNTER (OUTPATIENT)
Age: 49
End: 2024-01-22

## 2024-02-09 ENCOUNTER — TRANSCRIPTION ENCOUNTER (OUTPATIENT)
Age: 49
End: 2024-02-09

## 2024-03-08 ENCOUNTER — TRANSCRIPTION ENCOUNTER (OUTPATIENT)
Age: 49
End: 2024-03-08

## 2024-04-01 RX ORDER — FLUTICASONE PROPIONATE 50 UG/1
50 SPRAY, METERED NASAL DAILY
Qty: 3 | Refills: 3 | Status: ACTIVE | COMMUNITY
Start: 2024-04-01 | End: 1900-01-01

## 2024-04-16 ENCOUNTER — TRANSCRIPTION ENCOUNTER (OUTPATIENT)
Age: 49
End: 2024-04-16

## 2024-04-20 ENCOUNTER — NON-APPOINTMENT (OUTPATIENT)
Age: 49
End: 2024-04-20

## 2024-04-23 ENCOUNTER — TRANSCRIPTION ENCOUNTER (OUTPATIENT)
Age: 49
End: 2024-04-23

## 2024-05-13 ENCOUNTER — APPOINTMENT (OUTPATIENT)
Dept: BARIATRICS/WEIGHT MGMT | Facility: CLINIC | Age: 49
End: 2024-05-13
Payer: COMMERCIAL

## 2024-05-13 VITALS
HEART RATE: 86 BPM | HEIGHT: 63 IN | SYSTOLIC BLOOD PRESSURE: 121 MMHG | RESPIRATION RATE: 16 BRPM | BODY MASS INDEX: 35.44 KG/M2 | OXYGEN SATURATION: 100 % | WEIGHT: 200 LBS | DIASTOLIC BLOOD PRESSURE: 83 MMHG

## 2024-05-13 DIAGNOSIS — E66.01 MORBID (SEVERE) OBESITY DUE TO EXCESS CALORIES: ICD-10-CM

## 2024-05-13 DIAGNOSIS — Z80.1 FAMILY HISTORY OF MALIGNANT NEOPLASM OF TRACHEA, BRONCHUS AND LUNG: ICD-10-CM

## 2024-05-13 DIAGNOSIS — E88.810 METABOLIC SYNDROME: ICD-10-CM

## 2024-05-13 DIAGNOSIS — I10 ESSENTIAL (PRIMARY) HYPERTENSION: ICD-10-CM

## 2024-05-13 DIAGNOSIS — D31.30 BENIGN NEOPLASM OF UNSPECIFIED CHOROID: ICD-10-CM

## 2024-05-13 PROCEDURE — 99205 OFFICE O/P NEW HI 60 MIN: CPT

## 2024-05-13 PROCEDURE — G2211 COMPLEX E/M VISIT ADD ON: CPT

## 2024-05-13 RX ORDER — LOSARTAN POTASSIUM 50 MG/1
50 TABLET, FILM COATED ORAL DAILY
Refills: 0 | Status: ACTIVE | COMMUNITY

## 2024-05-13 RX ORDER — SEMAGLUTIDE 2.4 MG/.75ML
2.4 INJECTION, SOLUTION SUBCUTANEOUS
Refills: 0 | Status: ACTIVE | COMMUNITY

## 2024-05-13 NOTE — ASSESSMENT
[FreeTextEntry1] : 49F PMH class II obesity, prediabetes, HTN, low HDL, OA knees, GERD, hashimoto thyroiditis, choroidal nevus, who presents to weight management for initial evaluation.   # Metabolic syndrome (class II obesity - central, prediabetes, HTN, low HDL) c/b OA: Weight today 200 lbs, BMI 35.43, BF 48.3%. She notes weight gain over the years in the setting of 3 pregnancies, dx hypothryoidism. Engaged in weight loss attempts through weight watchers, will lose 10-12 lbs and stop. She started Wegovy 1.0 mg/wk about 6 months ago, initially had some GI side effects, has escalated to 2.4 mg/wk currently w/o side effect. Recently started working with dietician. Some exercise limitations but walks regularly.  - Food log - Meal planning/prep - C/w dietician - Discussed general dietary recs - Building physical activity - Discussed medical tx, will c/w Wegovy for now, may consider zepbound or addition of alternative in the future depending on how she is doing and supplies.  - F/u in 1-2 mo

## 2024-05-13 NOTE — HISTORY OF PRESENT ILLNESS
[FreeTextEntry1] : 49F PMH class II obesity, prediabetes, HTN, low HDL, OA knees, GERD, hashimoto thyroiditis, choroidal nevus, who presents to weight management for initial evaluation.   Weight/Diet History: Gained weight with 3 pregnancies, and dx hypothyroidism Has engaged in dietary weight loss attempts before, primarily with Weight Watchers. Will lose 10-12 lbs,  Has been on Wegovy since November 2023, currently on dose 2.4 mg/wk, has been on this dose for 1 month. She started on 1 mg, because of shortage, had constipation and stomach issues, have resolved.  lost 20 lbs Highest weight 226 lbs Recently started seeing a dietician.   Weight today: 200 lbs, BMI 35.43, BF 48.3%   Diet: B (9:30 am): kind bar or yogurt (Greek, fat-free) L (): Turkey and cheese sandwich, salad, sometimes just cheese and crackers.  D (6:30 pm): rice or pasta with baked chicken, roasted vegetables.  Dessert: Snack:  sometimes pretzels or chips or apple in afternoon Night-time eating: sometimes pretzels or chips Beverages: iced coffee or unsweeteened iced tea in afternoon. Tea with sugar and half and half in the morning. Estimates 60 oz water per day.  Fast-food/Restaurants: Pizza 1x/wk. Takeout 1x/wk, sometimes salad. Cook/Prepare meals: Food Journal: Weight Watchers in the past.    Exercise: Some limitation with knee/foot arthritis. Walks 2-3x/wk for 20-30 min   Sleep: "well". Sleeps 11 pm to 6:30 am, occasional wake up to go to the bathroom. No known snoring.    Social Hx: No tobacco use. Social alcohol, 2 drinks per week. Works for [employer],  for Cardiology. Lives with yeimy and youngest child  No FHx thyroid cancer.

## 2024-06-06 ENCOUNTER — APPOINTMENT (OUTPATIENT)
Dept: GASTROENTEROLOGY | Facility: CLINIC | Age: 49
End: 2024-06-06
Payer: COMMERCIAL

## 2024-06-06 VITALS
DIASTOLIC BLOOD PRESSURE: 78 MMHG | HEIGHT: 63 IN | BODY MASS INDEX: 35.26 KG/M2 | OXYGEN SATURATION: 98 % | HEART RATE: 87 BPM | WEIGHT: 199 LBS | SYSTOLIC BLOOD PRESSURE: 122 MMHG

## 2024-06-06 DIAGNOSIS — E66.9 OBESITY, UNSPECIFIED: ICD-10-CM

## 2024-06-06 DIAGNOSIS — Z12.11 ENCOUNTER FOR SCREENING FOR MALIGNANT NEOPLASM OF COLON: ICD-10-CM

## 2024-06-06 DIAGNOSIS — K21.9 GASTRO-ESOPHAGEAL REFLUX DISEASE W/OUT ESOPHAGITIS: ICD-10-CM

## 2024-06-06 DIAGNOSIS — K59.09 OTHER CONSTIPATION: ICD-10-CM

## 2024-06-06 PROCEDURE — 99204 OFFICE O/P NEW MOD 45 MIN: CPT

## 2024-06-06 PROCEDURE — G2211 COMPLEX E/M VISIT ADD ON: CPT

## 2024-06-06 NOTE — ASSESSMENT
[FreeTextEntry1] : - Colon cancer screening - colonoscopy due - Colonoscopy scheduled - Risks, benefits, alternatives were discussed, including but not limited to bleeding, infection, perforation and sedation risks. Additionally, the possibility of missed lesions was conveyed.  -GERD - Reviewed dietary and lifestyle modifications, including weight loss, smaller/frequent/earlier (>3h prior to lying down) meals, trials of cutting down/out caffeine, chocolate, tomatoes, fatty foods, alcohol.  EGD in light of chronicity.  -Constipation - reviewed dietary and lifestyle modifications, including increased fluid, fiber intake, as well as habituation / following urge to defecate, cutting back on bread/pasta, and to consider starting fiber supplement (eg.,  psyllium)  -obesity - wt loss - sees Dr. Hunt  PMD/consultation/hospital notes and Labs/imaging/prior endoscopic results reviewed to extent noted in HPI; and, if procedure code billed on this visit for lab draw, this serves to signify that labs were drawn here in this office.

## 2024-06-06 NOTE — REASON FOR VISIT
[Consultation] : a consultation visit [FreeTextEntry1] : Kindly asked by EDDY HYLTON  to consult and evaluate patient for colon screening, GERD                     A copy of this note is being sent to physician requesting consultation.

## 2024-06-06 NOTE — CONSULT LETTER
[FreeTextEntry1] : Dear Dr. EDDY SIMPSON ,  I had the pleasure of evaluating your patient,  RICCI WOO.  Please refer to my note below.  Thank you very much for allowing me to participate in the care of this patient.  If you have any questions, please do not hesitate to contact me.  Sincerely,   Thad Calle MD

## 2024-06-06 NOTE — HISTORY OF PRESENT ILLNESS
[FreeTextEntry1] : 49f obesity, HTN, presenting for colon cancer screening. Pt denies abdominal pain, rectal bleeding, change in bowel habits, or unexplained weight loss.    Last colonoscopy: none  +Heartburn w/ several times/wk ppi but no dysphagia/wt loss - x 5+years. +constipation since starting wegovy but improving w/ fiber.  Soc:  no tobacco or significant EtOH FHx: no FHx GI malignancy or IBD  ROS: Constitutional:: no weight loss, fevers ENT: no deafness Eyes: not blind Neck: no LN Chest: no dyspnea/cough Cardiac: no chest pain Vascular: no leg swelling GI: no abdominal pain, nausea, vomiting, diarrhea, constipation, rectal bleeding, dysphagia, melena unless otherwise noted in HPI : no dysuria, dark urine Skin: no rashes, jaundice Heme: no bleeding Endocrine: no DM unless otherwise stated in HPI  Px: (VS noted below) General: NAD Eyes: anicteric Oropharynx:  clear Neck: no LN Chest: normal respiratory effort CVS: regular Abd: soft, NT, ND, +BS, no HSM Ext: no atrophy Neuro: grossly nonfocal  Labs/imaging/prior endoscopic results reviewed to the extent available and noted in HPI

## 2024-06-21 ENCOUNTER — TRANSCRIPTION ENCOUNTER (OUTPATIENT)
Age: 49
End: 2024-06-21

## 2024-06-23 ENCOUNTER — RESULT REVIEW (OUTPATIENT)
Age: 49
End: 2024-06-23

## 2024-06-24 ENCOUNTER — APPOINTMENT (OUTPATIENT)
Dept: GASTROENTEROLOGY | Facility: HOSPITAL | Age: 49
End: 2024-06-24

## 2024-06-24 ENCOUNTER — RESULT REVIEW (OUTPATIENT)
Age: 49
End: 2024-06-24

## 2024-07-12 ENCOUNTER — NON-APPOINTMENT (OUTPATIENT)
Age: 49
End: 2024-07-12

## 2024-07-17 ENCOUNTER — APPOINTMENT (OUTPATIENT)
Dept: BARIATRICS/WEIGHT MGMT | Facility: CLINIC | Age: 49
End: 2024-07-17
Payer: COMMERCIAL

## 2024-07-17 DIAGNOSIS — E66.01 MORBID (SEVERE) OBESITY DUE TO EXCESS CALORIES: ICD-10-CM

## 2024-07-17 DIAGNOSIS — K59.00 CONSTIPATION, UNSPECIFIED: ICD-10-CM

## 2024-07-17 DIAGNOSIS — I10 ESSENTIAL (PRIMARY) HYPERTENSION: ICD-10-CM

## 2024-07-17 DIAGNOSIS — E88.810 METABOLIC SYNDROME: ICD-10-CM

## 2024-07-17 DIAGNOSIS — E03.9 HYPOTHYROIDISM, UNSPECIFIED: ICD-10-CM

## 2024-07-17 DIAGNOSIS — Z00.00 ENCOUNTER FOR GENERAL ADULT MEDICAL EXAMINATION W/OUT ABNORMAL FINDINGS: ICD-10-CM

## 2024-07-17 PROCEDURE — 99214 OFFICE O/P EST MOD 30 MIN: CPT

## 2024-07-17 PROCEDURE — G2211 COMPLEX E/M VISIT ADD ON: CPT

## 2024-07-22 LAB
25(OH)D3 SERPL-MCNC: 43.4 NG/ML
ALBUMIN SERPL ELPH-MCNC: 4.3 G/DL
ALP BLD-CCNC: 72 U/L
ALT SERPL-CCNC: 11 U/L
ANION GAP SERPL CALC-SCNC: 13 MMOL/L
AST SERPL-CCNC: 11 U/L
BILIRUB SERPL-MCNC: 0.4 MG/DL
BUN SERPL-MCNC: 9 MG/DL
CALCIUM SERPL-MCNC: 9.1 MG/DL
CHLORIDE SERPL-SCNC: 104 MMOL/L
CHOLEST SERPL-MCNC: 172 MG/DL
CO2 SERPL-SCNC: 20 MMOL/L
CREAT SERPL-MCNC: 0.89 MG/DL
EGFR: 78 ML/MIN/1.73M2
ESTIMATED AVERAGE GLUCOSE: 108 MG/DL
GLUCOSE SERPL-MCNC: 94 MG/DL
HBA1C MFR BLD HPLC: 5.4 %
HCT VFR BLD CALC: 42.4 %
HDLC SERPL-MCNC: 41 MG/DL
HGB BLD-MCNC: 13 G/DL
INSULIN SERPL-MCNC: 12.6 UU/ML
LDLC SERPL CALC-MCNC: 115 MG/DL
MCHC RBC-ENTMCNC: 27.5 PG
MCHC RBC-ENTMCNC: 30.7 GM/DL
MCV RBC AUTO: 89.6 FL
NONHDLC SERPL-MCNC: 131 MG/DL
PLATELET # BLD AUTO: 311 K/UL
POTASSIUM SERPL-SCNC: 5 MMOL/L
PROT SERPL-MCNC: 7 G/DL
RBC # BLD: 4.73 M/UL
RBC # FLD: 14.3 %
SODIUM SERPL-SCNC: 138 MMOL/L
T3FREE SERPL-MCNC: 2.45 PG/ML
T4 FREE SERPL-MCNC: 1.3 NG/DL
TRIGL SERPL-MCNC: 87 MG/DL
TSH SERPL-ACNC: 1.94 UIU/ML
WBC # FLD AUTO: 7.31 K/UL

## 2024-08-07 ENCOUNTER — NON-APPOINTMENT (OUTPATIENT)
Age: 49
End: 2024-08-07

## 2024-08-08 ENCOUNTER — RX RENEWAL (OUTPATIENT)
Age: 49
End: 2024-08-08

## 2024-09-04 ENCOUNTER — APPOINTMENT (OUTPATIENT)
Dept: ORTHOPEDIC SURGERY | Facility: CLINIC | Age: 49
End: 2024-09-04
Payer: COMMERCIAL

## 2024-09-04 DIAGNOSIS — M17.0 BILATERAL PRIMARY OSTEOARTHRITIS OF KNEE: ICD-10-CM

## 2024-09-04 PROCEDURE — 73564 X-RAY EXAM KNEE 4 OR MORE: CPT | Mod: 50

## 2024-09-04 PROCEDURE — 99204 OFFICE O/P NEW MOD 45 MIN: CPT

## 2024-09-04 RX ORDER — CELECOXIB 200 MG/1
200 CAPSULE ORAL TWICE DAILY
Qty: 60 | Refills: 4 | Status: ACTIVE | COMMUNITY
Start: 2024-09-04 | End: 1900-01-01

## 2024-09-04 NOTE — ASSESSMENT
[FreeTextEntry1] : Impression:  Bilateral knee osteoarthritis with a very recent flareup and/or the development of new symptoms in the posterior lateral corner of the right knee.  This seems most conducive to likely be a meniscus nature however again could be popliteal tendon in nature as well.  Physical exam seems to suggest meniscus pathology.  The short-term nature of her symptoms I would like to try a course of anti-inflammatory notably she has taken ibuprofen at 800 mg dose which has helped her symptoms slightly.  I am more concerned about the fact that the mechanical symptoms are very reproducible and recurrent with range of motion testing on physical exam today and for that reason I am not sending her to physical therapy.  I like to try to get this area to calm down from an inflammatory standpoint and then see where we are and what she would like to do.  If the mechanical symptoms worsen I would consider proceeding with MRI scanning as this may be a very significant meniscus tear based on the mechanical nature of what her exam showed today.  If Celebrex is inadequate to treat her pain would consider meloxicam while certainly we could go back to the higher dose of the ibuprofen and concerned about Murphy 1 effect.  Also talked her about the hopes that Celebrex might be beneficial to her in general as far as controlling pain discomfort and inflammation and potentially aid her in the longer run as far as being able to exercise and lose more weight.  I went over the radiographs with her answered any questions she is happy with the visit today and again is reinforced to her that she certainly can call or reach out to us if anything needs to change with respect to medications or if her symptoms were to worsen.  Please excuse any errors as this was completed with Dragon software.

## 2024-09-04 NOTE — PHYSICAL EXAM
[de-identified] : Physical exam shows a younger appearing than stated age female no acute distress very cooperative and interactive and very clear with what her symptoms are in the location of her pain.  Physical examination today with flexion and rotation does elicit a click that is reproducible along the posterior lateral joint line difficult to assess whether this is potentially lateral meniscus versus popliteal tendon or other structure.  I cannot detect any posterior lateral corner instability and again she had no injury that would suggest that this is something that would be concerned about.  There is no AP or varus valgus instability full extension is present there is no significant effusion there is a trace amount of synovitis patella tracking is very slight lateral but no J sign is present on both knees there are actually palpable osteophytes noted at the distal aspect of the trochlear patella articulation laterally which is palpated by the patient along the lateral joint line slightly above it.  Flexion is quite good past 120.  Distally she is intact neurovascularly.  Hip motion is intact and normal without recreation of any symptoms. Left knee exam is more benign in nature but shows similar findings associated with the patellofemoral articulation with slight lateralization potential for some palpable osteophytes no significant effusion or synovitis alignment is slight valgus there is no instability on varus valgus or AP testing range of motion is on restricted. [de-identified] : 4 view radiographs bilateral knee demonstrate advanced changes in the patellofemoral articulation grade 3 with peripheral spur formation and slight lateralization of the patellas bilaterally.  AP and Diego radiographs demonstrate some notch osteophytes present as well as some mild peaking to the tibial eminences however there is no subluxation associated with either knee.  Femoral-tibial articulations in general are relatively well-maintained and early grade 2 is far as narrowing however there are peripheral spurs that have formed and spotty locations notably off the patella as well as the posterior femoral condyles visible on lateral radiographs of both knees.  Bone density looks appropriate.

## 2024-09-04 NOTE — HISTORY OF PRESENT ILLNESS
[de-identified] : Shaina is a new patient today emptying for the evaluation of bilateral knees with more pain currently on the right than on the left.  Shaina is a new patient today M seen for the evaluation of bilateral knees with more pain currently on the right than on the left.  Over the last approximately 5 days despite a relatively chronic history of bilateral knee osteoarthritis and discomfort, she has had increased pain posteriorly along the lateral joint line associated with the right knee.  This is become somewhat mechanical in nature with a feeling of almost a catch as if the joint needs to pop in order for her to be able to get the knee fully straight.  Terminal straightening does seem to cause significant discomfort associated with her right knee to the extent that she is walking even with the knee slightly bent and on her toes to try to avoid that terminal extension.  She denies a specific injury associated with this denies a fall or twist misstep that she can recall.  Again she has a known history of bilateral knee osteoarthritis most significant associated with the patellofemoral compartments has bothered her when she goes upstairs.  Specifically in the last few days the right knee is really started to bother her coming downstairs much more so than.  It seems apparent that she is slowly and progressively actually deteriorated over the last period of time which has been frustrating because she would love to be able to walk as she is been very diligent and committed to losing weight through multiple avenues.

## 2024-09-25 ENCOUNTER — APPOINTMENT (OUTPATIENT)
Dept: BARIATRICS/WEIGHT MGMT | Facility: CLINIC | Age: 49
End: 2024-09-25
Payer: COMMERCIAL

## 2024-09-25 VITALS — BODY MASS INDEX: 34.91 KG/M2 | WEIGHT: 197 LBS | HEIGHT: 63 IN

## 2024-09-25 DIAGNOSIS — E88.810 METABOLIC SYNDROME: ICD-10-CM

## 2024-09-25 DIAGNOSIS — K59.00 CONSTIPATION, UNSPECIFIED: ICD-10-CM

## 2024-09-25 DIAGNOSIS — I10 ESSENTIAL (PRIMARY) HYPERTENSION: ICD-10-CM

## 2024-09-25 DIAGNOSIS — E66.01 MORBID (SEVERE) OBESITY DUE TO EXCESS CALORIES: ICD-10-CM

## 2024-09-25 PROCEDURE — 99214 OFFICE O/P EST MOD 30 MIN: CPT

## 2024-09-25 PROCEDURE — G2211 COMPLEX E/M VISIT ADD ON: CPT

## 2024-09-25 RX ORDER — TIRZEPATIDE 5 MG/.5ML
5 INJECTION, SOLUTION SUBCUTANEOUS
Qty: 1 | Refills: 2 | Status: ACTIVE | COMMUNITY
Start: 2024-09-25 | End: 1900-01-01

## 2024-09-25 NOTE — PHYSICAL EXAM
[Obese, well nourished, in no acute distress] : obese, well nourished, in no acute distress [de-identified] : TEB visit

## 2024-09-25 NOTE — PHYSICAL EXAM
[Obese, well nourished, in no acute distress] : obese, well nourished, in no acute distress [de-identified] : TEB visit

## 2024-09-25 NOTE — PHYSICAL EXAM
[Obese, well nourished, in no acute distress] : obese, well nourished, in no acute distress [de-identified] : TEB visit

## 2024-09-25 NOTE — ASSESSMENT
[FreeTextEntry1] : 49F PMH class II obesity, prediabetes, HTN, low HDL, OA knees, GERD, hashimoto thyroiditis, choroidal nevus, who presents to weight management for follow-up.   # Metabolic syndrome (class II obesity - central, prediabetes, HTN, low HDL) c/b OA: Weight today 197 lbs, BMI 34.9. Doing well on Wegovy 2.4 mg/wk, some constipation, using miralax, weight has not changed much, seems to be maintaining, may be in-part due to lesser activity levels with knee injury.  - Food log - Meal planning/prep - C/w dietician - Discussed general dietary recs - Set goal to walk at least 3x/wk - Updated labs ordered - C/w Wegovy 2.4 mg/wk this month, then trial switch to Zepbound, will start with 5 mg.  - F/u in 2 mo   switch to Zep IF

## 2024-09-25 NOTE — HISTORY OF PRESENT ILLNESS
[Home] : at home, [unfilled] , at the time of the visit. [Medical Office: ([facility])___] : at the medical office located in  [Verbal consent obtained from patient] : the patient, [unfilled] [FreeTextEntry1] : 49F PMH class II obesity, prediabetes, HTN, low HDL, OA knees, GERD, hashimoto thyroiditis, choroidal nevus, who presents to weight management for follow-up.   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 Engaged in weight loss attempts through weight watchers, would lose 10-12 lbs and stop. Highest weight 226 lbs. She started Wegovy 1.0 mg/wk about 6 months prior to initial visit, initially had some GI side effects, has escalated to 2.4 mg/wk w/o side effect. Had just started working with dietician. Some exercise limitations but walked regularly.  We discussed lifestyle and dietary interventions. She was prescribed to continue Wegovy.   Weight today: 197 lbs, BMI 34.9 Weight at last visit (7/17/24): 191 lbs, BMI 35.25 Weight at Initial Visit (5/13/24): 200 lbs, BMI 35.43, BF 48.3%  Medication: Has been on Wegovy 2.4 mg/wk, some constipation, using miralax  Diet: Working with dietician, feels she's doing better with her diet overall.    Exercise: Having knee issue, started celebrex and will go to PT.  (Some limitation with knee/foot arthritis. Knees do feel better with weigh tloss. Walks 2-3x/wk for 20-30 min   Sleep:  ("well". Sleeps 11 pm to 6:30 am, occasional wake up to go to the bathroom. No known snoring.

## 2024-09-25 NOTE — HISTORY OF PRESENT ILLNESS
[Home] : at home, [unfilled] , at the time of the visit. [Medical Office: ([facility])___] : at the medical office located in  [Verbal consent obtained from patient] : the patient, [unfilled] [FreeTextEntry1] : 49F PMH class II obesity, prediabetes, HTN, low HDL, OA knees, GERD, hashimoto thyroiditis, choroidal nevus, who presents to weight management for follow-up.   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 Engaged in weight loss attempts through weight watchers, would lose 10-12 lbs and stop. Highest weight 226 lbs. She started Wegovy 1.0 mg/wk about 6 months prior to initial visit, initially had some GI side effects, has escalated to 2.4 mg/wk w/o side effect. Had just started working with dietician. Some exercise limitations but walked regularly.  We discussed lifestyle and dietary interventions. She was prescribed to continue Wegovy.   Weight today: 197 lbs, BMI 34.9 Weight at last visit (7/17/24): 191 lbs, BMI 35.25 Weight at Initial Visit (5/13/24): 200 lbs, BMI 35.43, BF 48.3%  Medication: Has been on Wegovy 2.4 mg/wk, some constipation, using miralax  Diet: Working with dietician, feels she's doing better with her diet overall.    Exercise: Having knee issue, started celebrex and will go to PT.  (Some limitation with knee/foot arthritis. Knees do feel better with weigh tloss. Walks 2-3x/wk for 20-30 min   Sleep:  ("well". Sleeps 11 pm to 6:30 am, occasional wake up to go to the bathroom. No known snoring.

## 2024-09-25 NOTE — HISTORY OF PRESENT ILLNESS
[Home] : at home, [unfilled] , at the time of the visit. [Medical Office: ([facility])___] : at the medical office located in  [Verbal consent obtained from patient] : the patient, [unfilled] [FreeTextEntry1] : 49F PMH class II obesity, prediabetes, HTN, low HDL, OA knees, GERD, hashimoto thyroiditis, choroidal nevus, who presents to weight management for follow-up.   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 Engaged in weight loss attempts through weight watchers, would lose 10-12 lbs and stop. Highest weight 226 lbs. She started Wegovy 1.0 mg/wk about 6 months prior to initial visit, initially had some GI side effects, has escalated to 2.4 mg/wk w/o side effect. Had just started working with dietician. Some exercise limitations but walked regularly.  We discussed lifestyle and dietary interventions. She was prescribed to continue Wegovy.   Weight today: 197 lbs, BMI 34.9 Weight at last visit (7/17/24): 191 lbs, BMI 35.25 Weight at Initial Visit (5/13/24): 200 lbs, BMI 35.43, BF 48.3%  Medication: Has been on Wegovy 2.4 mg/wk, some constipation, using miralax  Diet: Working with dietician, feels she's doing better with her diet overall.    Exercise: Having knee issue, started celebrex and will go to PT.  (Some limitation with knee/foot arthritis. Knees do feel better with weigh tloss. Walks 2-3x/wk for 20-30 min   Sleep:  ("well". Sleeps 11 pm to 6:30 am, occasional wake up to go to the bathroom. No known snoring.

## 2024-11-19 PROBLEM — E66.812 CLASS 2 SEVERE OBESITY WITH SERIOUS COMORBIDITY IN ADULT: Status: ACTIVE | Noted: 2024-05-13

## 2024-11-20 ENCOUNTER — APPOINTMENT (OUTPATIENT)
Dept: BARIATRICS/WEIGHT MGMT | Facility: CLINIC | Age: 49
End: 2024-11-20
Payer: COMMERCIAL

## 2024-11-20 VITALS
SYSTOLIC BLOOD PRESSURE: 120 MMHG | DIASTOLIC BLOOD PRESSURE: 68 MMHG | HEIGHT: 63 IN | WEIGHT: 195 LBS | BODY MASS INDEX: 34.55 KG/M2

## 2024-11-20 DIAGNOSIS — M17.0 BILATERAL PRIMARY OSTEOARTHRITIS OF KNEE: ICD-10-CM

## 2024-11-20 DIAGNOSIS — E66.9 OBESITY, UNSPECIFIED: ICD-10-CM

## 2024-11-20 DIAGNOSIS — I10 ESSENTIAL (PRIMARY) HYPERTENSION: ICD-10-CM

## 2024-11-20 DIAGNOSIS — E66.01 OBESITY, CLASS 2: ICD-10-CM

## 2024-11-20 DIAGNOSIS — E66.812 OBESITY, CLASS 2: ICD-10-CM

## 2024-11-20 DIAGNOSIS — E88.810 METABOLIC SYNDROME: ICD-10-CM

## 2024-11-20 PROCEDURE — G2211 COMPLEX E/M VISIT ADD ON: CPT

## 2024-11-20 PROCEDURE — 99214 OFFICE O/P EST MOD 30 MIN: CPT

## 2024-11-20 RX ORDER — TIRZEPATIDE 10 MG/.5ML
10 INJECTION, SOLUTION SUBCUTANEOUS
Qty: 3 | Refills: 0 | Status: ACTIVE | COMMUNITY
Start: 2024-11-20 | End: 1900-01-01

## 2025-01-28 ENCOUNTER — APPOINTMENT (OUTPATIENT)
Dept: BARIATRICS/WEIGHT MGMT | Facility: CLINIC | Age: 50
End: 2025-01-28
Payer: COMMERCIAL

## 2025-01-28 VITALS — BODY MASS INDEX: 33.84 KG/M2 | WEIGHT: 191 LBS | HEIGHT: 63 IN

## 2025-01-28 DIAGNOSIS — I10 ESSENTIAL (PRIMARY) HYPERTENSION: ICD-10-CM

## 2025-01-28 DIAGNOSIS — M17.0 BILATERAL PRIMARY OSTEOARTHRITIS OF KNEE: ICD-10-CM

## 2025-01-28 DIAGNOSIS — K59.00 CONSTIPATION, UNSPECIFIED: ICD-10-CM

## 2025-01-28 DIAGNOSIS — E66.812 OBESITY, CLASS 2: ICD-10-CM

## 2025-01-28 DIAGNOSIS — E66.9 OBESITY, UNSPECIFIED: ICD-10-CM

## 2025-01-28 DIAGNOSIS — E66.01 OBESITY, CLASS 2: ICD-10-CM

## 2025-01-28 DIAGNOSIS — E88.810 METABOLIC SYNDROME: ICD-10-CM

## 2025-01-28 PROCEDURE — 99214 OFFICE O/P EST MOD 30 MIN: CPT | Mod: 95

## 2025-02-11 ENCOUNTER — LABORATORY RESULT (OUTPATIENT)
Age: 50
End: 2025-02-11

## 2025-02-12 LAB
24R-OH-CALCIDIOL SERPL-MCNC: 75.4 PG/ML
ALBUMIN SERPL ELPH-MCNC: 4.2 G/DL
ALP BLD-CCNC: 64 U/L
ALT SERPL-CCNC: 10 U/L
ANION GAP SERPL CALC-SCNC: 14 MMOL/L
AST SERPL-CCNC: 12 U/L
BILIRUB SERPL-MCNC: 0.4 MG/DL
BUN SERPL-MCNC: 8 MG/DL
CALCIUM SERPL-MCNC: 8.8 MG/DL
CHLORIDE SERPL-SCNC: 103 MMOL/L
CHOLEST SERPL-MCNC: 177 MG/DL
CO2 SERPL-SCNC: 20 MMOL/L
CREAT SERPL-MCNC: 0.84 MG/DL
EGFR: 84 ML/MIN/1.73M2
ESTIMATED AVERAGE GLUCOSE: 100 MG/DL
GLUCOSE SERPL-MCNC: 90 MG/DL
HBA1C MFR BLD HPLC: 5.1 %
HDLC SERPL-MCNC: 49 MG/DL
LDLC SERPL CALC-MCNC: 112 MG/DL
NONHDLC SERPL-MCNC: 128 MG/DL
POTASSIUM SERPL-SCNC: 4.3 MMOL/L
PROT SERPL-MCNC: 7 G/DL
SODIUM SERPL-SCNC: 137 MMOL/L
T3FREE SERPL-MCNC: 2.6 PG/ML
T3RU NFR SERPL: 1.1 TBI
T4 SERPL-MCNC: 9.3 UG/DL
TRIGL SERPL-MCNC: 90 MG/DL
TSH SERPL-ACNC: 1.2 UIU/ML
VIT B12 SERPL-MCNC: 997 PG/ML

## 2025-02-13 ENCOUNTER — NON-APPOINTMENT (OUTPATIENT)
Age: 50
End: 2025-02-13

## 2025-02-13 ENCOUNTER — APPOINTMENT (OUTPATIENT)
Dept: PSYCHIATRY | Facility: CLINIC | Age: 50
End: 2025-02-13
Payer: COMMERCIAL

## 2025-02-13 PROCEDURE — 90791 PSYCH DIAGNOSTIC EVALUATION: CPT | Mod: 95

## 2025-04-09 ENCOUNTER — APPOINTMENT (OUTPATIENT)
Dept: BARIATRICS/WEIGHT MGMT | Facility: CLINIC | Age: 50
End: 2025-04-09
Payer: COMMERCIAL

## 2025-04-09 VITALS
SYSTOLIC BLOOD PRESSURE: 114 MMHG | DIASTOLIC BLOOD PRESSURE: 84 MMHG | HEIGHT: 63 IN | HEART RATE: 85 BPM | BODY MASS INDEX: 32.96 KG/M2 | WEIGHT: 186 LBS

## 2025-04-09 DIAGNOSIS — E66.01 OBESITY, CLASS 2: ICD-10-CM

## 2025-04-09 DIAGNOSIS — E66.812 OBESITY, CLASS 2: ICD-10-CM

## 2025-04-09 DIAGNOSIS — E88.810 METABOLIC SYNDROME: ICD-10-CM

## 2025-04-09 DIAGNOSIS — M17.0 BILATERAL PRIMARY OSTEOARTHRITIS OF KNEE: ICD-10-CM

## 2025-04-09 DIAGNOSIS — K59.00 CONSTIPATION, UNSPECIFIED: ICD-10-CM

## 2025-04-09 DIAGNOSIS — I10 ESSENTIAL (PRIMARY) HYPERTENSION: ICD-10-CM

## 2025-04-09 PROCEDURE — 99214 OFFICE O/P EST MOD 30 MIN: CPT

## 2025-04-09 PROCEDURE — G2211 COMPLEX E/M VISIT ADD ON: CPT

## 2025-04-09 RX ORDER — TIRZEPATIDE 15 MG/.5ML
15 INJECTION, SOLUTION SUBCUTANEOUS
Qty: 3 | Refills: 0 | Status: ACTIVE | COMMUNITY
Start: 2025-04-09 | End: 1900-01-01

## 2025-07-07 ENCOUNTER — APPOINTMENT (OUTPATIENT)
Dept: BARIATRICS/WEIGHT MGMT | Facility: CLINIC | Age: 50
End: 2025-07-07
Payer: COMMERCIAL

## 2025-07-07 VITALS — BODY MASS INDEX: 32.6 KG/M2 | WEIGHT: 184 LBS | HEIGHT: 63 IN

## 2025-07-07 PROCEDURE — 99214 OFFICE O/P EST MOD 30 MIN: CPT | Mod: 95

## 2025-07-07 PROCEDURE — G2211 COMPLEX E/M VISIT ADD ON: CPT | Mod: 95
